# Patient Record
Sex: MALE | Race: WHITE | NOT HISPANIC OR LATINO | Employment: PART TIME | URBAN - METROPOLITAN AREA
[De-identification: names, ages, dates, MRNs, and addresses within clinical notes are randomized per-mention and may not be internally consistent; named-entity substitution may affect disease eponyms.]

---

## 2018-08-13 ENCOUNTER — ANESTHESIA - HEALTHEAST (OUTPATIENT)
Dept: SURGERY | Facility: CLINIC | Age: 53
End: 2018-08-13

## 2018-08-13 ASSESSMENT — MIFFLIN-ST. JEOR: SCORE: 1756.5

## 2018-08-14 ENCOUNTER — SURGERY - HEALTHEAST (OUTPATIENT)
Dept: SURGERY | Facility: CLINIC | Age: 53
End: 2018-08-14

## 2018-08-14 ASSESSMENT — MIFFLIN-ST. JEOR: SCORE: 1753.45

## 2021-06-01 VITALS — HEIGHT: 68 IN | BODY MASS INDEX: 31.37 KG/M2 | WEIGHT: 207 LBS

## 2021-06-19 NOTE — ANESTHESIA PROCEDURE NOTES
Peripheral Block    Patient location during procedure: pre-op  Start time: 8/14/2018 6:51 AM  End time: 8/14/2018 6:58 AM  post-op analgesia per surgeon order as noted in medical record  Staffing:  Performing  Anesthesiologist: JIM CASTRO  Preanesthetic Checklist  Completed: patient identified, site marked, risks, benefits, and alternatives discussed, timeout performed, consent obtained, airway assessed, oxygen available, suction available, emergency drugs available and hand hygiene performed  Peripheral Block  Block type: brachial plexus, supraclavicular  Prep: ChloraPrep  Patient position: sitting  Patient monitoring: cardiac monitor, continuous pulse oximetry, heart rate and blood pressure  Laterality: left  Injection technique: ultrasound guided    Ultrasound used to visualize needle placement in proximity to nerve being blocked: yes   Permanent ultrasound image captured for medical record  Sterile gel and probe cover used for ultrasound.    Needle  Needle type: echogenic   Needle gauge: 20G  Needle length: 4 in  no peripheral nerve catheter placed  Assessment  Injection assessment: no difficulty with injection, negative aspiration for heme, no paresthesia on injection and incremental injection

## 2021-06-19 NOTE — ANESTHESIA CARE TRANSFER NOTE
Last vitals:   Vitals:    08/14/18 0833   BP: 102/59   Pulse: 74   Resp: 16   Temp:    SpO2: 99%     Patient's level of consciousness is awake  Spontaneous respirations: yes  Maintains airway independently: yes  Dentition unchanged: yes  Oropharynx: oropharynx clear of all foreign objects    QCDR Measures:  ASA# 20 - Surgical Safety Checklist: WHO surgical safety checklist completed prior to induction  PQRS# 430 - Adult PONV Prevention: 4558F - Pt received => 2 anti-emetic agents (different classes) preop & intraop  ASA# 8 - Peds PONV Prevention: NA - Not pediatric patient, not GA or 2 or more risk factors NOT present  PQRS# 424 - Margarita-op Temp Management: NA - MAC anesthesia or case < 60 minutes  PQRS# 426 - PACU Transfer Protocol: - Transfer of care checklist used  ASA# 14 - Acute Post-op Pain: ASA14B - Patient did NOT experience pain >= 7 out of 10

## 2021-06-19 NOTE — ANESTHESIA POSTPROCEDURE EVALUATION
Patient: Tc No Mi Pakirtzis  LEFT ULNAR NERVE DECOMPRESSION AT WRIST  Anesthesia type: general    Patient location: PACU  Last vitals:   Vitals:    08/14/18 0945   BP: 124/78   Pulse: 71   Resp: 18   Temp: 36.4  C (97.6  F)   SpO2: 98%     Post vital signs: stable  Level of consciousness: awake and responds to simple questions  Post-anesthesia pain: pain controlled  Post-anesthesia nausea and vomiting: no  Pulmonary: unassisted, return to baseline  Cardiovascular: stable and blood pressure at baseline  Hydration: adequate  Anesthetic events: no    QCDR Measures:  ASA# 11 - Margarita-op Cardiac Arrest: ASA11B - Patient did NOT experience unanticipated cardiac arrest  ASA# 12 - Margarita-op Mortality Rate: ASA12B - Patient did NOT die  ASA# 13 - PACU Re-Intubation Rate: ASA13B - Patient did NOT require a new airway mgmt  ASA# 10 - Composite Anes Safety: ASA10A - No serious adverse event    Additional Notes:

## 2021-07-03 NOTE — ANESTHESIA PREPROCEDURE EVALUATION
Anesthesia Preprocedure Evaluation by Dante Etienne MD at 8/13/2018  3:37 PM     Author: Dante Etienne MD Service: -- Author Type: Physician    Filed: 8/14/2018  6:43 AM Date of Service: 8/13/2018  3:37 PM Status: Addendum    : Dante Etienne MD (Physician)    Related Notes: Original Note by Dante Etienne MD (Physician) filed at 8/13/2018  3:44 PM       Anesthesia Evaluation      Patient summary reviewed   No history of anesthetic complications     Airway   Mallampati: II  Neck ROM: full   Pulmonary     breath sounds clear to auscultation  (+) asthma    (-) COPD, shortness of breath, recent URI, sleep apnea, rhonchi, wheezes, rales, stridor, not a smoker                         Cardiovascular   (+) , hypercholesterolemia,     (-) hypertension, CAD, CABG/stent, angina, murmur  Rhythm: regular  Rate: normal,    no murmur      Neuro/Psych    (+) neuromuscular disease (ulnar neuropathy at wrist),  depression,   (-) no seizures, no CVA    Endo/Other    (+) obesity (BMI 31.58),   (-) no diabetes, hypothyroidism, arthritis     GI/Hepatic/Renal    (-) GERD, impaired hepatic function, renal disease          Dental - normal exam     Comment: No chipped, partial, removable or dentures.                           Anesthesia Plan  Planned anesthetic: general mask, total IV anesthesia and peripheral nerve block  Plan for supraclavicular versus axillary nerve block for postoperative analgesia. Propofol gtt intraop.  ASA 2   Induction: intravenous   Anesthetic plan and risks discussed with: patient    Post-op plan: routine recovery